# Patient Record
Sex: FEMALE | Race: WHITE | NOT HISPANIC OR LATINO | Employment: OTHER | URBAN - METROPOLITAN AREA
[De-identification: names, ages, dates, MRNs, and addresses within clinical notes are randomized per-mention and may not be internally consistent; named-entity substitution may affect disease eponyms.]

---

## 2024-06-15 ENCOUNTER — APPOINTMENT (OUTPATIENT)
Dept: CT IMAGING | Facility: HOSPITAL | Age: 84
End: 2024-06-15
Payer: MEDICARE

## 2024-06-15 ENCOUNTER — HOSPITAL ENCOUNTER (INPATIENT)
Facility: HOSPITAL | Age: 84
LOS: 2 days | Discharge: NURSING, REHAB OR LONG-TERM CARE | End: 2024-06-17
Attending: FAMILY MEDICINE | Admitting: FAMILY MEDICINE
Payer: MEDICARE

## 2024-06-15 ENCOUNTER — APPOINTMENT (OUTPATIENT)
Dept: GENERAL RADIOLOGY | Facility: HOSPITAL | Age: 84
End: 2024-06-15
Payer: MEDICARE

## 2024-06-15 DIAGNOSIS — R41.82 ALTERED MENTAL STATUS, UNSPECIFIED ALTERED MENTAL STATUS TYPE: Primary | ICD-10-CM

## 2024-06-15 DIAGNOSIS — A49.9 UTI (URINARY TRACT INFECTION), BACTERIAL: ICD-10-CM

## 2024-06-15 DIAGNOSIS — N39.0 UTI (URINARY TRACT INFECTION), BACTERIAL: ICD-10-CM

## 2024-06-15 DIAGNOSIS — R79.89 ELEVATED TROPONIN: ICD-10-CM

## 2024-06-15 LAB
ALBUMIN SERPL-MCNC: 3.9 G/DL (ref 3.5–5.2)
ALBUMIN/GLOB SERPL: 1.2 G/DL
ALP SERPL-CCNC: 84 U/L (ref 39–117)
ALT SERPL W P-5'-P-CCNC: 22 U/L (ref 1–33)
ANION GAP SERPL CALCULATED.3IONS-SCNC: 13.2 MMOL/L (ref 5–15)
AST SERPL-CCNC: 27 U/L (ref 1–32)
BACTERIA UR QL AUTO: ABNORMAL /HPF
BASOPHILS # BLD AUTO: 0.05 10*3/MM3 (ref 0–0.2)
BASOPHILS NFR BLD AUTO: 0.4 % (ref 0–1.5)
BILIRUB SERPL-MCNC: 0.3 MG/DL (ref 0–1.2)
BILIRUB UR QL STRIP: NEGATIVE
BUN SERPL-MCNC: 19 MG/DL (ref 8–23)
BUN/CREAT SERPL: 22.6 (ref 7–25)
CALCIUM SPEC-SCNC: 9 MG/DL (ref 8.6–10.5)
CHLORIDE SERPL-SCNC: 105 MMOL/L (ref 98–107)
CLARITY UR: CLEAR
CO2 SERPL-SCNC: 21.8 MMOL/L (ref 22–29)
COLOR UR: YELLOW
CREAT SERPL-MCNC: 0.84 MG/DL (ref 0.57–1)
CRP SERPL-MCNC: <0.3 MG/DL (ref 0–0.5)
D-LACTATE SERPL-SCNC: 3 MMOL/L (ref 0.5–2)
D-LACTATE SERPL-SCNC: 4.8 MMOL/L (ref 0.3–2)
DEPRECATED RDW RBC AUTO: 46.9 FL (ref 37–54)
EGFRCR SERPLBLD CKD-EPI 2021: 69 ML/MIN/1.73
EOSINOPHIL # BLD AUTO: 0.12 10*3/MM3 (ref 0–0.4)
EOSINOPHIL NFR BLD AUTO: 1 % (ref 0.3–6.2)
ERYTHROCYTE [DISTWIDTH] IN BLOOD BY AUTOMATED COUNT: 12.8 % (ref 12.3–15.4)
ERYTHROCYTE [SEDIMENTATION RATE] IN BLOOD: 15 MM/HR (ref 0–30)
GEN 5 2HR TROPONIN T REFLEX: 263 NG/L
GLOBULIN UR ELPH-MCNC: 3.3 GM/DL
GLUCOSE SERPL-MCNC: 115 MG/DL (ref 65–99)
GLUCOSE UR STRIP-MCNC: NEGATIVE MG/DL
HCT VFR BLD AUTO: 44.3 % (ref 34–46.6)
HGB BLD-MCNC: 13.8 G/DL (ref 12–15.9)
HGB UR QL STRIP.AUTO: ABNORMAL
HYALINE CASTS UR QL AUTO: ABNORMAL /LPF
IMM GRANULOCYTES # BLD AUTO: 0.28 10*3/MM3 (ref 0–0.05)
IMM GRANULOCYTES NFR BLD AUTO: 2.3 % (ref 0–0.5)
KETONES UR QL STRIP: NEGATIVE
LEUKOCYTE ESTERASE UR QL STRIP.AUTO: ABNORMAL
LYMPHOCYTES # BLD AUTO: 2.32 10*3/MM3 (ref 0.7–3.1)
LYMPHOCYTES NFR BLD AUTO: 19.3 % (ref 19.6–45.3)
MCH RBC QN AUTO: 30.7 PG (ref 26.6–33)
MCHC RBC AUTO-ENTMCNC: 31.2 G/DL (ref 31.5–35.7)
MCV RBC AUTO: 98.7 FL (ref 79–97)
MONOCYTES # BLD AUTO: 0.58 10*3/MM3 (ref 0.1–0.9)
MONOCYTES NFR BLD AUTO: 4.8 % (ref 5–12)
NEUTROPHILS NFR BLD AUTO: 72.2 % (ref 42.7–76)
NEUTROPHILS NFR BLD AUTO: 8.64 10*3/MM3 (ref 1.7–7)
NITRITE UR QL STRIP: POSITIVE
NRBC BLD AUTO-RTO: 0 /100 WBC (ref 0–0.2)
PH UR STRIP.AUTO: <=5 [PH] (ref 5–8)
PLATELET # BLD AUTO: 280 10*3/MM3 (ref 140–450)
PMV BLD AUTO: 10 FL (ref 6–12)
POTASSIUM SERPL-SCNC: 3.8 MMOL/L (ref 3.5–5.2)
PROT SERPL-MCNC: 7.2 G/DL (ref 6–8.5)
PROT UR QL STRIP: NEGATIVE
RBC # BLD AUTO: 4.49 10*6/MM3 (ref 3.77–5.28)
RBC # UR STRIP: ABNORMAL /HPF
REF LAB TEST METHOD: ABNORMAL
SODIUM SERPL-SCNC: 140 MMOL/L (ref 136–145)
SP GR UR STRIP: 1.02 (ref 1–1.03)
SQUAMOUS #/AREA URNS HPF: ABNORMAL /HPF
TROPONIN T DELTA: 64 NG/L
TROPONIN T SERPL HS-MCNC: 199 NG/L
UROBILINOGEN UR QL STRIP: ABNORMAL
WBC # UR STRIP: ABNORMAL /HPF
WBC NRBC COR # BLD AUTO: 11.99 10*3/MM3 (ref 3.4–10.8)

## 2024-06-15 PROCEDURE — 99285 EMERGENCY DEPT VISIT HI MDM: CPT

## 2024-06-15 PROCEDURE — 25010000002 CEFTRIAXONE PER 250 MG: Performed by: NURSE PRACTITIONER

## 2024-06-15 PROCEDURE — 85025 COMPLETE CBC W/AUTO DIFF WBC: CPT | Performed by: NURSE PRACTITIONER

## 2024-06-15 PROCEDURE — 25010000002 CEFTRIAXONE PER 250 MG: Performed by: STUDENT IN AN ORGANIZED HEALTH CARE EDUCATION/TRAINING PROGRAM

## 2024-06-15 PROCEDURE — 86140 C-REACTIVE PROTEIN: CPT | Performed by: NURSE PRACTITIONER

## 2024-06-15 PROCEDURE — 93005 ELECTROCARDIOGRAM TRACING: CPT | Performed by: NURSE PRACTITIONER

## 2024-06-15 PROCEDURE — 25010000002 ENOXAPARIN PER 10 MG: Performed by: NURSE PRACTITIONER

## 2024-06-15 PROCEDURE — 81001 URINALYSIS AUTO W/SCOPE: CPT | Performed by: NURSE PRACTITIONER

## 2024-06-15 PROCEDURE — 80053 COMPREHEN METABOLIC PANEL: CPT | Performed by: NURSE PRACTITIONER

## 2024-06-15 PROCEDURE — 36415 COLL VENOUS BLD VENIPUNCTURE: CPT

## 2024-06-15 PROCEDURE — 87040 BLOOD CULTURE FOR BACTERIA: CPT | Performed by: NURSE PRACTITIONER

## 2024-06-15 PROCEDURE — 71045 X-RAY EXAM CHEST 1 VIEW: CPT

## 2024-06-15 PROCEDURE — 87086 URINE CULTURE/COLONY COUNT: CPT | Performed by: NURSE PRACTITIONER

## 2024-06-15 PROCEDURE — 85652 RBC SED RATE AUTOMATED: CPT | Performed by: NURSE PRACTITIONER

## 2024-06-15 PROCEDURE — 83605 ASSAY OF LACTIC ACID: CPT | Performed by: NURSE PRACTITIONER

## 2024-06-15 PROCEDURE — 70450 CT HEAD/BRAIN W/O DYE: CPT

## 2024-06-15 PROCEDURE — 87077 CULTURE AEROBIC IDENTIFY: CPT | Performed by: NURSE PRACTITIONER

## 2024-06-15 PROCEDURE — 84484 ASSAY OF TROPONIN QUANT: CPT | Performed by: NURSE PRACTITIONER

## 2024-06-15 PROCEDURE — 25810000003 SODIUM CHLORIDE 0.9 % SOLUTION: Performed by: NURSE PRACTITIONER

## 2024-06-15 PROCEDURE — 87186 SC STD MICRODIL/AGAR DIL: CPT | Performed by: NURSE PRACTITIONER

## 2024-06-15 PROCEDURE — P9612 CATHETERIZE FOR URINE SPEC: HCPCS

## 2024-06-15 RX ORDER — BISACODYL 10 MG
10 SUPPOSITORY, RECTAL RECTAL DAILY PRN
Status: DISCONTINUED | OUTPATIENT
Start: 2024-06-15 | End: 2024-06-17 | Stop reason: HOSPADM

## 2024-06-15 RX ORDER — LANOLIN ALCOHOL/MO/W.PET/CERES
3 CREAM (GRAM) TOPICAL NIGHTLY
COMMUNITY

## 2024-06-15 RX ORDER — BISACODYL 5 MG/1
5 TABLET, DELAYED RELEASE ORAL DAILY PRN
Status: DISCONTINUED | OUTPATIENT
Start: 2024-06-15 | End: 2024-06-17 | Stop reason: HOSPADM

## 2024-06-15 RX ORDER — SODIUM CHLORIDE 9 MG/ML
40 INJECTION, SOLUTION INTRAVENOUS AS NEEDED
Status: DISCONTINUED | OUTPATIENT
Start: 2024-06-15 | End: 2024-06-17 | Stop reason: HOSPADM

## 2024-06-15 RX ORDER — ONDANSETRON 4 MG/1
4 TABLET, ORALLY DISINTEGRATING ORAL EVERY 8 HOURS PRN
COMMUNITY

## 2024-06-15 RX ORDER — ENOXAPARIN SODIUM 100 MG/ML
40 INJECTION SUBCUTANEOUS DAILY
Status: DISCONTINUED | OUTPATIENT
Start: 2024-06-15 | End: 2024-06-17 | Stop reason: HOSPADM

## 2024-06-15 RX ORDER — SODIUM CHLORIDE 0.9 % (FLUSH) 0.9 %
10 SYRINGE (ML) INJECTION EVERY 12 HOURS SCHEDULED
Status: DISCONTINUED | OUTPATIENT
Start: 2024-06-15 | End: 2024-06-17 | Stop reason: HOSPADM

## 2024-06-15 RX ORDER — ACETAMINOPHEN 325 MG/1
650 TABLET ORAL 4 TIMES DAILY
COMMUNITY

## 2024-06-15 RX ORDER — POTASSIUM CHLORIDE 20 MEQ/1
20 TABLET, EXTENDED RELEASE ORAL
COMMUNITY

## 2024-06-15 RX ORDER — SODIUM CHLORIDE 9 MG/ML
100 INJECTION, SOLUTION INTRAVENOUS CONTINUOUS
Status: DISCONTINUED | OUTPATIENT
Start: 2024-06-15 | End: 2024-06-17 | Stop reason: HOSPADM

## 2024-06-15 RX ORDER — POLYETHYLENE GLYCOL 3350 17 G/17G
17 POWDER, FOR SOLUTION ORAL DAILY PRN
COMMUNITY

## 2024-06-15 RX ORDER — NALOXONE HYDROCHLORIDE 4 MG/.1ML
1 SPRAY NASAL
COMMUNITY

## 2024-06-15 RX ORDER — DIPHENHYDRAMINE HYDROCHLORIDE 25 MG/1
5 TABLET ORAL DAILY
COMMUNITY

## 2024-06-15 RX ORDER — SODIUM CHLORIDE 0.9 % (FLUSH) 0.9 %
10 SYRINGE (ML) INJECTION AS NEEDED
Status: DISCONTINUED | OUTPATIENT
Start: 2024-06-15 | End: 2024-06-17 | Stop reason: HOSPADM

## 2024-06-15 RX ORDER — AMOXICILLIN 250 MG
2 CAPSULE ORAL 2 TIMES DAILY
Status: DISCONTINUED | OUTPATIENT
Start: 2024-06-15 | End: 2024-06-17 | Stop reason: HOSPADM

## 2024-06-15 RX ORDER — NYSTATIN 100000 U/G
1 CREAM TOPICAL 2 TIMES DAILY
COMMUNITY

## 2024-06-15 RX ORDER — LANOLIN ALCOHOL/MO/W.PET/CERES
1000 CREAM (GRAM) TOPICAL DAILY
COMMUNITY

## 2024-06-15 RX ORDER — TRAMADOL HYDROCHLORIDE 50 MG/1
50 TABLET ORAL 3 TIMES DAILY
COMMUNITY

## 2024-06-15 RX ORDER — AMLODIPINE BESYLATE 5 MG/1
5 TABLET ORAL DAILY
COMMUNITY

## 2024-06-15 RX ORDER — POLYETHYLENE GLYCOL 3350 17 G/17G
17 POWDER, FOR SOLUTION ORAL DAILY PRN
Status: DISCONTINUED | OUTPATIENT
Start: 2024-06-15 | End: 2024-06-17 | Stop reason: HOSPADM

## 2024-06-15 RX ADMIN — ENOXAPARIN SODIUM 40 MG: 100 INJECTION SUBCUTANEOUS at 21:29

## 2024-06-15 RX ADMIN — CEFTRIAXONE 1000 MG: 1 INJECTION, POWDER, FOR SOLUTION INTRAMUSCULAR; INTRAVENOUS at 21:28

## 2024-06-15 RX ADMIN — SODIUM CHLORIDE 1000 ML: 9 INJECTION, SOLUTION INTRAVENOUS at 10:14

## 2024-06-15 RX ADMIN — Medication 10 ML: at 21:32

## 2024-06-15 RX ADMIN — CEFTRIAXONE 1000 MG: 1 INJECTION, POWDER, FOR SOLUTION INTRAMUSCULAR; INTRAVENOUS at 10:17

## 2024-06-15 NOTE — H&P
[facility] Medicine Services  History & Physical    Patient Name: Amparo Cohen  : 1940  MRN: [id number]  Primary Care Physician:  Can Vieira MD  Date of admission: 6/15/2024  Date and Time of Service: 6/15/2024 at 12:43 PM EDT      Subjective      Chief Complaint: Dementia, UTI    History of Present Illness: Amparo Cohen is a 83 y.o. female with a CMH of dementia, Alzheimer's, who presented to [facility] on 6/15/2024 with altered mental status and seizure-like activity.  Patient is a resident of [facility].  EMS was called today for patient to have seizure-like activity which lasted approximately 30 minutes, patient desatted during this time and was requiring oxygen upon EMS arrival.  Patient is a very poor historian, unable to answer questions but can respond to her name being called.  No family is at bedside at this time.    In the emergency department CT head negative.  EKG NSR, chest x-ray negative.  Initial lactate 4.5, 3 on repeat.  Patient was given 1 L of IV fluids, and 1 g of Rocephin due to urine analysis which was remarkable for: Trace blood, positive nitrite, trace leukocytes, 6-10 whites, 4+ bacteria.      Review of Systems   Unable to perform ROS: Dementia       Personal History     History reviewed. No pertinent past medical history.    History reviewed. No pertinent surgical history.    Family History: family history is not on file. Otherwise pertinent FHx was reviewed and not pertinent to current issue.    Social History:      Home Medications:  Prior to Admission Medications       None              Allergies:  No Known Allergies    Objective      Vitals:   Temp:  [97.7 °F (36.5 °C)] 97.7 °F (36.5 °C)  Heart Rate:  [71-80] 71  Resp:  [16-19] 16  BP: (101-130)/(62-75) 101/71  Body mass index is 21.26 kg/m².  Physical Exam  PHYSICAL EXAM  Constitutional:  Well developed, well nourished, no acute distress, non-toxic appearance   Eyes:  PERRL, conjunctiva normal,  EOMI   HENT:  Atraumatic, external ears normal, nose normal, oropharynx moist, no pharyngeal exudates. Neck-normal range of motion, no tenderness, supple, trachea midline  Respiratory:  ctab, non-labored respirations without accessory muscle use  Cardiovascular:  Normal rate, normal rhythm, no murmurs, no gallops, no rubs   GI:  Soft, nondistended, normal bowel sounds, nontender, no organomegaly, no mass, no rebound, no guarding   :  No costovertebral angle tenderness   Musculoskeletal:  No edema, no tenderness, no deformities  Integument:  Well hydrated, no rash   Lymphatic:  No lymphadenopathy noted   Neurologic: Demented, normal motor function, normal sensory function, no focal deficits noted   Psychiatric: Babbling speech  Diagnostic Data:  Lab Results (last 24 hours)       Procedure Component Value Units Date/Time    C-reactive Protein [[id number]]  (Normal) Collected: 06/15/24 1103    Specimen: Blood from Arm, Left Updated: 06/15/24 1156     C-Reactive Protein <0.30 mg/dL     STAT Lactic Acid, Reflex [[id number]]  (Abnormal) Collected: 06/15/24 1103    Specimen: Blood Updated: 06/15/24 1137     Lactate 3.0 mmol/L     High Sensitivity Troponin T [[id number]]  (Abnormal) Collected: 06/15/24 1103    Specimen: Blood from Arm, Left Updated: 06/15/24 1136     HS Troponin T 199 ng/L     Narrative:      High Sensitive Troponin T Reference Range:  <14.0 ng/L- Negative Female for AMI  <22.0 ng/L- Negative Male for AMI  >=14 - Abnormal Female indicating possible myocardial injury.  >=22 - Abnormal Male indicating possible myocardial injury.   Clinicians would have to utilize clinical acumen, EKG, Troponin, and serial changes to determine if it is an Acute Myocardial Infarction or myocardial injury due to an underlying chronic condition.         Comprehensive Metabolic Panel [[id number]]  (Abnormal) Collected: 06/15/24 1103    Specimen: Blood from Arm, Left Updated: 06/15/24 1135     Glucose 115 mg/dL      BUN 19 mg/dL       Creatinine 0.84 mg/dL      Sodium 140 mmol/L      Potassium 3.8 mmol/L      Chloride 105 mmol/L      CO2 21.8 mmol/L      Calcium 9.0 mg/dL      Total Protein 7.2 g/dL      Albumin 3.9 g/dL      ALT (SGPT) 22 U/L      AST (SGOT) 27 U/L      Alkaline Phosphatase 84 U/L      Total Bilirubin 0.3 mg/dL      Globulin 3.3 gm/dL      A/G Ratio 1.2 g/dL      BUN/Creatinine Ratio 22.6     Anion Gap 13.2 mmol/L      eGFR 69.0 mL/min/1.73     Narrative:      GFR Normal >60  Chronic Kidney Disease <60  Kidney Failure <15    The GFR formula is only valid for adults with stable renal function between ages 18 and 70.    Blood Culture - Blood, Arm, Right [[id number]] Collected: 06/15/24 1006    Specimen: Blood from Arm, Right Updated: 06/15/24 1012    Urinalysis With Culture If Indicated - Straight Cath [[id number]]  (Abnormal) Collected: 06/15/24 0911    Specimen: Urine from Straight Cath Updated: 06/15/24 0938     Color, UA Yellow     Appearance, UA Clear     pH, UA <=5.0     Specific Gravity, UA 1.019     Glucose, UA Negative     Ketones, UA Negative     Bilirubin, UA Negative     Blood, UA Trace     Protein, UA Negative     Leuk Esterase, UA Trace     Nitrite, UA Positive     Urobilinogen, UA 0.2 E.U./dL    Narrative:      In absence of clinical symptoms, the presence of pyuria, bacteria, and/or nitrites on the urinalysis result does not correlate with infection.    Urinalysis, Microscopic Only - Straight Cath [[id number]]  (Abnormal) Collected: 06/15/24 0911    Specimen: Urine from Straight Cath Updated: 06/15/24 0938     RBC, UA 0-2 /HPF      WBC, UA 6-10 /HPF      Bacteria, UA 4+ /HPF      Squamous Epithelial Cells, UA 3-6 /HPF      Hyaline Casts, UA 3-6 /LPF      Methodology Automated Microscopy    Urine Culture - Urine, Straight Cath [[id number]] Collected: 06/15/24 0911    Specimen: Urine from Straight Cath Updated: 06/15/24 0938    Sedimentation Rate [[id number]]  (Normal) Collected: 06/15/24 0830    Specimen: Blood from Arm,  Left Updated: 06/15/24 0841     Sed Rate 15 mm/hr     CBC & Differential [[id number]]  (Abnormal) Collected: 06/15/24 0830    Specimen: Blood from Arm, Left Updated: 06/15/24 0837    Narrative:      The following orders were created for panel order CBC & Differential.  Procedure                               Abnormality         Status                     ---------                               -----------         ------                     CBC Auto Differential[[id number]]        Abnormal            Final result                 Please view results for these tests on the individual orders.    CBC Auto Differential [[id number]]  (Abnormal) Collected: 06/15/24 0830    Specimen: Blood from Arm, Left Updated: 06/15/24 0837     WBC 11.99 10*3/mm3      RBC 4.49 10*6/mm3      Hemoglobin 13.8 g/dL      Hematocrit 44.3 %      MCV 98.7 fL      MCH 30.7 pg      MCHC 31.2 g/dL      RDW 12.8 %      RDW-SD 46.9 fl      MPV 10.0 fL      Platelets 280 10*3/mm3      Neutrophil % 72.2 %      Lymphocyte % 19.3 %      Monocyte % 4.8 %      Eosinophil % 1.0 %      Basophil % 0.4 %      Immature Grans % 2.3 %      Neutrophils, Absolute 8.64 10*3/mm3      Lymphocytes, Absolute 2.32 10*3/mm3      Monocytes, Absolute 0.58 10*3/mm3      Eosinophils, Absolute 0.12 10*3/mm3      Basophils, Absolute 0.05 10*3/mm3      Immature Grans, Absolute 0.28 10*3/mm3      nRBC 0.0 /100 WBC     POC Lactate [[id number]]  (Abnormal) Collected: 06/15/24 0834    Specimen: Blood Updated: 06/15/24 0836     Lactate 4.8 mmol/L      Comment: Serial Number: [id number]:  [id number]       Blood Culture - Blood, Arm, Left [[id number]] Collected: 06/15/24 0830    Specimen: Blood from Arm, Left Updated: 06/15/24 0833             Imaging Results (Last 24 Hours)       Procedure Component Value Units Date/Time    CT Head Without Contrast [[id number]] Collected: 06/15/24 0854     Updated: 06/15/24 0857    Narrative:      CT HEAD WO CONTRAST    Date of Exam: 6/15/2024 8:43 AM  EDT    Indication: new onset of seizures.    Comparison: None available.    Technique: Axial CT images were obtained of the head without contrast administration.  Coronal reconstructions were performed.  Automated exposure control and iterative reconstruction methods were used.      Findings:  There is motion artifact which limits image quality.    There is no evidence of acute territorial infarction.    There is no acute intracranial hemorrhage. There are no extra-axial collections.    Ventricles and CSF spaces are symmetrically prominent. No mass effect nor hydrocephalus.    There is moderate to advanced white matter hypoattenuation most frequently seen in the setting of microvascular ischemic disease. No underlying mass is identified on unenhanced CT imaging.     Paranasal sinuses and mastoid air cells are adequately aerated.     Osseous structures and orbits appear intact.      Impression:      Impression:  1.Advanced senescent changes of the brain without mass lesion or acute process identified on CT imaging.      Electronically Signed: Mike Leon MD    6/15/2024 8:55 AM EDT    Workstation ID: [id number]    XR Chest 1 View [[id number]] Collected: 06/15/24 0826     Updated: 06/15/24 0830    Narrative:      XR CHEST 1 VW    Date of Exam: 6/15/2024 8:25 AM EDT    Indication: altered mental    Comparison: None available.    Findings:  Heart size borderline. No gross infiltrate or edema. Atelectasis in the right lung base. Moderate hiatal hernia apparently containing a loop of intestine      Impression:      Impression:    1. No gross infiltrate or edema  2. Hiatal hernia      Electronically Signed: Timoteo Han    6/15/2024 8:28 AM EDT    Workstation ID: [id number]              Assessment & Plan        This is a 83 y.o. female with:    Active and Resolved Problems  Active Hospital Problems    Diagnosis  POA    **UTI (urinary tract infection) [N39.0]  Yes      Resolved Hospital Problems   No resolved problems to  display.       UTI  Dehydration  - MIVF  - Continue Rocephin every 24 hours  - Urine cultures pending    Dementia  - Continue Zoloft, tramadol, melatonin    HTN  -Continue amlodipine    VTE Prophylaxis:  Pharmacologic VTE prophylaxis orders are present.        The patient desires to be as follows:    CODE STATUS:           Yasmine Blum, who can be contacted at [phone], is the designated person to make medical decisions on the patient's behalf if She is incapable of doing so. This was clarified with patient and/or next of kin on 6/15/2024 during the course of this H&P.    Admission Status:  I believe this patient meets inpatient status.    Expected Length of Stay: 2 nights    PDMP and Medication Dispenses via Sidebar reviewed and consistent with patient reported medications.    I discussed the patient's findings and my recommendations with patient.      Signature:     This document has been electronically signed by PHIL Daniel on Violeta 15, 2024 12:40 EDT   [facility]

## 2024-06-15 NOTE — Clinical Note
Level of Care: Telemetry [5]   Admitting Physician: KAREN NIX [[id number]]   Attending Physician: KAREN NIX [[id number]]

## 2024-06-15 NOTE — LETTER
EMS Transport Request  For use at [facility], [place], [place], [place], and [place] only   Patient Name: Amparo Cohen : 1940   Weight:55.1 kg (121 lb 7.6 oz) Pick-up Location: 2108 ([facility]) BLS/ALS: BLS/ALS: BLS   Insurance: [employer] MEDICARE REPLACEMENT Auth End Date: N/A   Pre-Cert #: N/A D/C Summary complete: Yes   Destination: Other [facility]   Contact Precautions: None   Equipment (O2, Fluids, etc.): None   Arrive By Date/Time: 24 1500 Stretcher/WC: Stretcher   CM Requesting: Dania Waldron RN     Office Phone: [phone]  Office Cell: [phone]   Ext: [phone]   Notes/Medical Necessity: Dementia, Alzheimer's, confused     ______________________________________________________________________    *Only 2 patient bags OR 1 carry-on size bag are permitted.  Wheelchairs and walkers CANNOT transported with the patient. Acknowledge: Yes

## 2024-06-16 LAB
ALBUMIN SERPL-MCNC: 3.7 G/DL (ref 3.5–5.2)
ALBUMIN SERPL-MCNC: 3.9 G/DL (ref 3.5–5.2)
ALBUMIN/GLOB SERPL: 1.2 G/DL
ALBUMIN/GLOB SERPL: 1.3 G/DL
ALP SERPL-CCNC: 89 U/L (ref 39–117)
ALP SERPL-CCNC: 93 U/L (ref 39–117)
ALT SERPL W P-5'-P-CCNC: 18 U/L (ref 1–33)
ALT SERPL W P-5'-P-CCNC: 20 U/L (ref 1–33)
ANION GAP SERPL CALCULATED.3IONS-SCNC: 11.3 MMOL/L (ref 5–15)
ANION GAP SERPL CALCULATED.3IONS-SCNC: 13.1 MMOL/L (ref 5–15)
AST SERPL-CCNC: 35 U/L (ref 1–32)
AST SERPL-CCNC: 38 U/L (ref 1–32)
BASOPHILS # BLD AUTO: 0.04 10*3/MM3 (ref 0–0.2)
BASOPHILS # BLD AUTO: 0.04 10*3/MM3 (ref 0–0.2)
BASOPHILS NFR BLD AUTO: 0.3 % (ref 0–1.5)
BASOPHILS NFR BLD AUTO: 0.3 % (ref 0–1.5)
BILIRUB SERPL-MCNC: 0.6 MG/DL (ref 0–1.2)
BILIRUB SERPL-MCNC: 0.7 MG/DL (ref 0–1.2)
BUN SERPL-MCNC: 14 MG/DL (ref 8–23)
BUN SERPL-MCNC: 16 MG/DL (ref 8–23)
BUN/CREAT SERPL: 17.6 (ref 7–25)
BUN/CREAT SERPL: 19.7 (ref 7–25)
CALCIUM SPEC-SCNC: 8.6 MG/DL (ref 8.6–10.5)
CALCIUM SPEC-SCNC: 8.7 MG/DL (ref 8.6–10.5)
CHLORIDE SERPL-SCNC: 103 MMOL/L (ref 98–107)
CHLORIDE SERPL-SCNC: 104 MMOL/L (ref 98–107)
CO2 SERPL-SCNC: 20.9 MMOL/L (ref 22–29)
CO2 SERPL-SCNC: 21.7 MMOL/L (ref 22–29)
CREAT SERPL-MCNC: 0.71 MG/DL (ref 0.57–1)
CREAT SERPL-MCNC: 0.91 MG/DL (ref 0.57–1)
D-LACTATE SERPL-SCNC: 1.9 MMOL/L (ref 0.5–2)
DEPRECATED RDW RBC AUTO: 43.1 FL (ref 37–54)
DEPRECATED RDW RBC AUTO: 45 FL (ref 37–54)
EGFRCR SERPLBLD CKD-EPI 2021: 62.7 ML/MIN/1.73
EGFRCR SERPLBLD CKD-EPI 2021: 84.5 ML/MIN/1.73
EOSINOPHIL # BLD AUTO: 0 10*3/MM3 (ref 0–0.4)
EOSINOPHIL # BLD AUTO: 0.02 10*3/MM3 (ref 0–0.4)
EOSINOPHIL NFR BLD AUTO: 0 % (ref 0.3–6.2)
EOSINOPHIL NFR BLD AUTO: 0.2 % (ref 0.3–6.2)
ERYTHROCYTE [DISTWIDTH] IN BLOOD BY AUTOMATED COUNT: 12.4 % (ref 12.3–15.4)
ERYTHROCYTE [DISTWIDTH] IN BLOOD BY AUTOMATED COUNT: 12.7 % (ref 12.3–15.4)
GLOBULIN UR ELPH-MCNC: 2.9 GM/DL
GLOBULIN UR ELPH-MCNC: 3.2 GM/DL
GLUCOSE SERPL-MCNC: 119 MG/DL (ref 65–99)
GLUCOSE SERPL-MCNC: 121 MG/DL (ref 65–99)
HCT VFR BLD AUTO: 40.5 % (ref 34–46.6)
HCT VFR BLD AUTO: 42 % (ref 34–46.6)
HGB BLD-MCNC: 13.1 G/DL (ref 12–15.9)
HGB BLD-MCNC: 13.8 G/DL (ref 12–15.9)
IMM GRANULOCYTES # BLD AUTO: 0.05 10*3/MM3 (ref 0–0.05)
IMM GRANULOCYTES # BLD AUTO: 0.06 10*3/MM3 (ref 0–0.05)
IMM GRANULOCYTES NFR BLD AUTO: 0.4 % (ref 0–0.5)
IMM GRANULOCYTES NFR BLD AUTO: 0.4 % (ref 0–0.5)
LARGE PLATELETS: NORMAL
LYMPHOCYTES # BLD AUTO: 1.43 10*3/MM3 (ref 0.7–3.1)
LYMPHOCYTES # BLD AUTO: 1.64 10*3/MM3 (ref 0.7–3.1)
LYMPHOCYTES NFR BLD AUTO: 10.4 % (ref 19.6–45.3)
LYMPHOCYTES NFR BLD AUTO: 14.1 % (ref 19.6–45.3)
MCH RBC QN AUTO: 31 PG (ref 26.6–33)
MCH RBC QN AUTO: 31.1 PG (ref 26.6–33)
MCHC RBC AUTO-ENTMCNC: 32.3 G/DL (ref 31.5–35.7)
MCHC RBC AUTO-ENTMCNC: 32.9 G/DL (ref 31.5–35.7)
MCV RBC AUTO: 94.4 FL (ref 79–97)
MCV RBC AUTO: 96.2 FL (ref 79–97)
MONOCYTES # BLD AUTO: 1.02 10*3/MM3 (ref 0.1–0.9)
MONOCYTES # BLD AUTO: 1.16 10*3/MM3 (ref 0.1–0.9)
MONOCYTES NFR BLD AUTO: 10 % (ref 5–12)
MONOCYTES NFR BLD AUTO: 7.4 % (ref 5–12)
NEUTROPHILS NFR BLD AUTO: 11.15 10*3/MM3 (ref 1.7–7)
NEUTROPHILS NFR BLD AUTO: 75 % (ref 42.7–76)
NEUTROPHILS NFR BLD AUTO: 8.73 10*3/MM3 (ref 1.7–7)
NEUTROPHILS NFR BLD AUTO: 81.5 % (ref 42.7–76)
NRBC BLD AUTO-RTO: 0 /100 WBC (ref 0–0.2)
NRBC BLD AUTO-RTO: 0 /100 WBC (ref 0–0.2)
PLATELET # BLD AUTO: 223 10*3/MM3 (ref 140–450)
PLATELET # BLD AUTO: 256 10*3/MM3 (ref 140–450)
PMV BLD AUTO: 10.1 FL (ref 6–12)
PMV BLD AUTO: 9.9 FL (ref 6–12)
POTASSIUM SERPL-SCNC: 3.3 MMOL/L (ref 3.5–5.2)
POTASSIUM SERPL-SCNC: 3.6 MMOL/L (ref 3.5–5.2)
PROT SERPL-MCNC: 6.6 G/DL (ref 6–8.5)
PROT SERPL-MCNC: 7.1 G/DL (ref 6–8.5)
RBC # BLD AUTO: 4.21 10*6/MM3 (ref 3.77–5.28)
RBC # BLD AUTO: 4.45 10*6/MM3 (ref 3.77–5.28)
RBC MORPH BLD: NORMAL
SODIUM SERPL-SCNC: 137 MMOL/L (ref 136–145)
SODIUM SERPL-SCNC: 137 MMOL/L (ref 136–145)
WBC MORPH BLD: NORMAL
WBC NRBC COR # BLD AUTO: 11.64 10*3/MM3 (ref 3.4–10.8)
WBC NRBC COR # BLD AUTO: 13.7 10*3/MM3 (ref 3.4–10.8)

## 2024-06-16 PROCEDURE — 85007 BL SMEAR W/DIFF WBC COUNT: CPT | Performed by: NURSE PRACTITIONER

## 2024-06-16 PROCEDURE — 25010000002 ENOXAPARIN PER 10 MG: Performed by: NURSE PRACTITIONER

## 2024-06-16 PROCEDURE — 83605 ASSAY OF LACTIC ACID: CPT | Performed by: INTERNAL MEDICINE

## 2024-06-16 PROCEDURE — 80053 COMPREHEN METABOLIC PANEL: CPT | Performed by: NURSE PRACTITIONER

## 2024-06-16 PROCEDURE — 85025 COMPLETE CBC W/AUTO DIFF WBC: CPT | Performed by: NURSE PRACTITIONER

## 2024-06-16 PROCEDURE — 25010000002 CEFTRIAXONE PER 250 MG: Performed by: STUDENT IN AN ORGANIZED HEALTH CARE EDUCATION/TRAINING PROGRAM

## 2024-06-16 PROCEDURE — 25810000003 SODIUM CHLORIDE 0.9 % SOLUTION: Performed by: NURSE PRACTITIONER

## 2024-06-16 PROCEDURE — 25810000003 SODIUM CHLORIDE 0.9 % SOLUTION: Performed by: INTERNAL MEDICINE

## 2024-06-16 RX ORDER — POTASSIUM CHLORIDE 7.45 MG/ML
10 INJECTION INTRAVENOUS
Status: COMPLETED | OUTPATIENT
Start: 2024-06-17 | End: 2024-06-17

## 2024-06-16 RX ADMIN — SENNOSIDES AND DOCUSATE SODIUM 2 TABLET: 50; 8.6 TABLET ORAL at 20:56

## 2024-06-16 RX ADMIN — CEFTRIAXONE 1000 MG: 1 INJECTION, POWDER, FOR SOLUTION INTRAMUSCULAR; INTRAVENOUS at 20:56

## 2024-06-16 RX ADMIN — Medication 10 ML: at 20:57

## 2024-06-16 RX ADMIN — ENOXAPARIN SODIUM 40 MG: 100 INJECTION SUBCUTANEOUS at 16:46

## 2024-06-16 RX ADMIN — SODIUM CHLORIDE 100 ML/HR: 9 INJECTION, SOLUTION INTRAVENOUS at 02:14

## 2024-06-16 RX ADMIN — SODIUM CHLORIDE 1000 ML: 9 INJECTION, SOLUTION INTRAVENOUS at 07:35

## 2024-06-16 RX ADMIN — SODIUM CHLORIDE 100 ML/HR: 9 INJECTION, SOLUTION INTRAVENOUS at 19:31

## 2024-06-16 NOTE — PLAN OF CARE
Goal Outcome Evaluation:  Plan of Care Reviewed With: patient        Progress: no change       Patient remains confused. Patient is not able to follow directions. No new orders at this time

## 2024-06-16 NOTE — PLAN OF CARE
Goal Outcome Evaluation:      This RN assumed care of this pt at 1300. Pt resting comfortably this shift. Pt with some oral intake this shift. Unable to verbalize wants or needs. Pt turned Q2 to prevent pressure injuries. VSS.       Problem: Adult Inpatient Plan of Care  Goal: Plan of Care Review  Outcome: Ongoing, Progressing  Goal: Patient-Specific Goal (Individualized)  Outcome: Ongoing, Progressing  Goal: Absence of Hospital-Acquired Illness or Injury  Outcome: Ongoing, Progressing  Intervention: Identify and Manage Fall Risk  Recent Flowsheet Documentation  Taken 6/16/2024 1830 by Ana Lilia Serna RN  Safety Promotion/Fall Prevention:   safety round/check completed   room organization consistent   nonskid shoes/slippers when out of bed   fall prevention program maintained   clutter free environment maintained   assistive device/personal items within reach  Taken 6/16/2024 1645 by Ana Lilia Serna RN  Safety Promotion/Fall Prevention:   safety round/check completed   room organization consistent   nonskid shoes/slippers when out of bed   fall prevention program maintained   clutter free environment maintained   assistive device/personal items within reach  Taken 6/16/2024 1445 by Ana Lilia Serna RN  Safety Promotion/Fall Prevention:   safety round/check completed   room organization consistent   nonskid shoes/slippers when out of bed   fall prevention program maintained   clutter free environment maintained   assistive device/personal items within reach  Intervention: Prevent Skin Injury  Recent Flowsheet Documentation  Taken 6/16/2024 1830 by Ana Lilia Serna RN  Body Position:   supine   weight shifting  Taken 6/16/2024 1645 by Ana Lilia Serna RN  Body Position:   tilted   right  Taken 6/16/2024 1445 by Ana Lilia Serna RN  Body Position:   tilted   left   position changed independently   weight shifting  Intervention: Prevent and Manage VTE (Venous Thromboembolism) Risk  Recent  Flowsheet Documentation  Taken 6/16/2024 1830 by Ana Lilia Serna RN  Activity Management: bedrest  Taken 6/16/2024 1645 by Ana Lilia Serna RN  Activity Management: bedrest  Taken 6/16/2024 1445 by Ana Lilia Serna RN  Activity Management: activity minimized  Intervention: Prevent Infection  Recent Flowsheet Documentation  Taken 6/16/2024 1830 by Ana Lilia Serna RN  Infection Prevention:   single patient room provided   personal protective equipment utilized   equipment surfaces disinfected   hand hygiene promoted  Taken 6/16/2024 1645 by Ana Lilia Serna RN  Infection Prevention:   single patient room provided   hand hygiene promoted   personal protective equipment utilized   equipment surfaces disinfected  Taken 6/16/2024 1445 by Ana Lilia Serna RN  Infection Prevention:   single patient room provided   rest/sleep promoted   personal protective equipment utilized   hand hygiene promoted   equipment surfaces disinfected  Goal: Optimal Comfort and Wellbeing  Outcome: Ongoing, Progressing  Intervention: Monitor Pain and Promote Comfort  Recent Flowsheet Documentation  Taken 6/16/2024 1645 by Ana Lilia Serna RN  Pain Management Interventions:   care clustered   pain management plan reviewed with patient/caregiver   pillow support provided   position adjusted   quiet environment facilitated   relaxation techniques promoted  Goal: Readiness for Transition of Care  Outcome: Ongoing, Progressing     Problem: Skin Injury Risk Increased  Goal: Skin Health and Integrity  Outcome: Ongoing, Progressing  Intervention: Optimize Skin Protection  Recent Flowsheet Documentation  Taken 6/16/2024 1830 by Ana Lilia Serna RN  Head of Bed (HOB) Positioning: HOB elevated  Taken 6/16/2024 1645 by Ana Lilia Serna RN  Head of Bed (HOB) Positioning: HOB elevated  Taken 6/16/2024 1445 by Ana Lilia Serna RN  Head of Bed (HOB) Positioning: HOB elevated

## 2024-06-16 NOTE — PROGRESS NOTES
[facility] MEDICINE SERVICE  DAILY PROGRESS NOTE    NAME: Amparo Cohen  : 1940  MRN: [id number]      LOS: 1 day     PROVIDER OF SERVICE: Jay Johnson MD    Chief Complaint: UTI (urinary tract infection)    Subjective:     Interval History: Patient more communicative today but still very confused and demented at baseline.    Review of Systems:   Review of Systems    Objective:     Vital Signs  Temp:  [97.5 °F (36.4 °C)-98 °F (36.7 °C)] 97.5 °F (36.4 °C)  Heart Rate:  [] 75  Resp:  [15-24] 16  BP: ()/() 95/77   Body mass index is 20.93 kg/m².    Physical Exam  Physical Exam  General Appearance: Alert, does not answer questions appropriately due to baseline dementia  Head:  Normocephalic, without obvious abnormality, atraumatic  Eyes:  PERRL, conjunctiva/corneas clear, EOM's intact, fundi benign, both eyes  Ears:  Normal TM's and external ear canals, both ears  Nose: Nares normal, septum midline, mucosa normal, no drainage or sinus tenderness  Throat: Lips, mucosa, and tongue normal; teeth and gums normal  Neck: Supple, symmetrical, trachea midline, no adenopathy, thyroid: not enlarged, symmetric, no tenderness/mass/nodules, no carotid bruit or JVD  Lungs:   Clear to auscultation bilaterally, respirations unlabored  Heart:  Regular rate and rhythm, S1, S2 normal, no murmur, rub or gallop  Abdomen:  Soft, non-tender, bowel sounds active all four quadrants,  no masses, no organomegaly  Extremities: Extremities normal, atraumatic, no cyanosis or edema  Pulses: 2+ and symmetric  Skin: Skin color, texture, turgor normal, no rashes or lesions  Neurologic: Severe dementia at baseline      Scheduled Meds   cefTRIAXone, 1,000 mg, Intravenous, Q24H  enoxaparin, 40 mg, Subcutaneous, Daily  senna-docusate sodium, 2 tablet, Oral, BID  sodium chloride, 10 mL, Intravenous, Q12H       PRN Meds     senna-docusate sodium **AND** polyethylene glycol **AND** bisacodyl **AND** bisacodyl    Calcium  Replacement - Follow Nurse / BPA Driven Protocol    Magnesium Standard Dose Replacement - Follow Nurse / BPA Driven Protocol    Phosphorus Replacement - Follow Nurse / BPA Driven Protocol    Potassium Replacement - Follow Nurse / BPA Driven Protocol    [COMPLETED] Insert Peripheral IV **AND** sodium chloride    sodium chloride    sodium chloride   Infusions  sodium chloride, 100 mL/hr, Last Rate: 100 mL/hr (06/16/24 0214)          Diagnostic Data    Results from last 7 days   Lab Units 06/16/24  0744   WBC 10*3/mm3 13.70*   HEMOGLOBIN g/dL 13.8   HEMATOCRIT % 42.0   PLATELETS 10*3/mm3 256   GLUCOSE mg/dL 119*   CREATININE mg/dL 0.71   BUN mg/dL 14   SODIUM mmol/L 137   POTASSIUM mmol/L 3.6   AST (SGOT) U/L 38*   ALT (SGPT) U/L 20   ALK PHOS U/L 89   BILIRUBIN mg/dL 0.7   ANION GAP mmol/L 13.1       CT Head Without Contrast    Result Date: 6/15/2024  Impression: 1.Advanced senescent changes of the brain without mass lesion or acute process identified on CT imaging. Electronically Signed: Miek Leon MD  6/15/2024 8:55 AM EDT  Workstation ID: [id number]    XR Chest 1 View    Result Date: 6/15/2024  Impression: 1. No gross infiltrate or edema 2. Hiatal hernia Electronically Signed: Timoteo Han  6/15/2024 8:28 AM EDT  Workstation ID: [id number]       I reviewed the patient's new clinical results.    Assessment/Plan:     Active and Resolved Problems  Active Hospital Problems    Diagnosis  POA    **UTI (urinary tract infection) [N39.0]  Yes      Resolved Hospital Problems   No resolved problems to display.       Acute UTI with volume depletion  -Per urinalysis  -Continue IV Rocephin  -Follow-up urine culture  -Continue IV fluids    Possible seizure episode  -Unclear if this was a true seizure, however patient did have some confusion with some shaking spells  -Likely related to underlying UTI  -Continue UTI treatment as above  -Hold off on AED medication given that if the patient did have seizure, it was likely triggered  by underlying UTI    Advanced dementia  -Continue home medications    Hypertension  -Continue amlodipine    VTE Prophylaxis:  Pharmacologic VTE prophylaxis orders are present.         Code status is   Code Status and Medical Interventions:   Ordered at: 06/15/24 1300     Level Of Support Discussed With:    Next of Kin (If No Surrogate)     Code Status (Patient has no pulse and is not breathing):    No CPR (Do Not Attempt to Resuscitate)     Medical Interventions (Patient has pulse or is breathing):    Comfort Measures     Comments:    Per post form- with patient       Plan for disposition: Hopefully DC back to her LTC on 6/18    Time: 30 minutes    Signature: Electronically signed by Jay Johnson MD, 06/16/24, 09:14 EDT.  [facility] Hospitalist Team

## 2024-06-16 NOTE — NURSING NOTE
Patient is confused and will not stay still in order to get labs. This nurse had 2 techs help and only the green top tube was obtained. This nurse tried a second attempt to get labs and the patient became a little combative. Day charge will be notified.

## 2024-06-17 ENCOUNTER — APPOINTMENT (OUTPATIENT)
Dept: NEUROLOGY | Facility: HOSPITAL | Age: 84
End: 2024-06-17
Payer: MEDICARE

## 2024-06-17 VITALS
RESPIRATION RATE: 19 BRPM | WEIGHT: 121.47 LBS | BODY MASS INDEX: 21.52 KG/M2 | DIASTOLIC BLOOD PRESSURE: 48 MMHG | SYSTOLIC BLOOD PRESSURE: 131 MMHG | HEART RATE: 59 BPM | TEMPERATURE: 98 F | HEIGHT: 63 IN | OXYGEN SATURATION: 92 %

## 2024-06-17 LAB
BACTERIA SPEC AEROBE CULT: ABNORMAL
POTASSIUM SERPL-SCNC: 4.3 MMOL/L (ref 3.5–5.2)
QT INTERVAL: 408 MS
QTC INTERVAL: 445 MS

## 2024-06-17 PROCEDURE — 84132 ASSAY OF SERUM POTASSIUM: CPT | Performed by: INTERNAL MEDICINE

## 2024-06-17 PROCEDURE — 95812 EEG 41-60 MINUTES: CPT

## 2024-06-17 PROCEDURE — 25010000002 POTASSIUM CHLORIDE 10 MEQ/100ML SOLUTION: Performed by: INTERNAL MEDICINE

## 2024-06-17 RX ORDER — CEFDINIR 300 MG/1
300 CAPSULE ORAL 2 TIMES DAILY
Qty: 10 CAPSULE | Refills: 0 | Status: SHIPPED | OUTPATIENT
Start: 2024-06-17 | End: 2024-06-22

## 2024-06-17 RX ADMIN — POTASSIUM CHLORIDE 10 MEQ: 7.46 INJECTION, SOLUTION INTRAVENOUS at 01:44

## 2024-06-17 RX ADMIN — Medication 10 ML: at 08:01

## 2024-06-17 RX ADMIN — POTASSIUM CHLORIDE 10 MEQ: 7.46 INJECTION, SOLUTION INTRAVENOUS at 00:05

## 2024-06-17 RX ADMIN — POTASSIUM CHLORIDE 10 MEQ: 7.46 INJECTION, SOLUTION INTRAVENOUS at 02:45

## 2024-06-17 RX ADMIN — POTASSIUM CHLORIDE 10 MEQ: 7.46 INJECTION, SOLUTION INTRAVENOUS at 03:45

## 2024-06-17 NOTE — NURSING NOTE
This RN made an attempt to call report to hal arreguin but was not able to reach of that nurse, will try again shortly.

## 2024-06-17 NOTE — PLAN OF CARE
Goal Outcome Evaluation:           Progress: no change  Outcome Evaluation: Patient on potassium replacement with morning lab showing 3.3. Resting comfortably. VSS.

## 2024-06-17 NOTE — CASE MANAGEMENT/SOCIAL WORK
Discharge Planning Assessment   Emir     Patient Name: Amparo Cohen  MRN: [id number]  Today's Date: 6/17/2024    Admit Date: 6/15/2024    Plan: Anticipate return to [facility]. No precert or PASRR needed for return.   Discharge Needs Assessment       Row Name 06/17/24 1355       Living Environment    People in Home facility resident  [facility]    Current Living Arrangements extended care facility    Potentially Unsafe Housing Conditions none    In the past 12 months has the electric, gas, oil, or water company threatened to shut off services in your home? No    Primary Care Provided by other (see comments)    Provides Primary Care For no one    Family Caregiver if Needed sibling(s);other relative(s)    Family Caregiver Names Sister- Sabinaty, niece- Aurea    Quality of Family Relationships helpful;involved;supportive    Able to Return to Prior Arrangements yes       Resource/Environmental Concerns    Resource/Environmental Concerns none    Transportation Concerns none       Transportation Needs    In the past 12 months, has lack of transportation kept you from medical appointments or from getting medications? no    In the past 12 months, has lack of transportation kept you from meetings, work, or from getting things needed for daily living? No       Food Insecurity    Within the past 12 months, you worried that your food would run out before you got the money to buy more. Never true    Within the past 12 months, the food you bought just didn't last and you didn't have money to get more. Never true       Transition Planning    Patient/Family Anticipates Transition to long-term care facility    Patient/Family Anticipated Services at Transition none    Transportation Anticipated health plan transportation       Discharge Needs Assessment    Readmission Within the Last 30 Days no previous admission in last 30 days    Equipment Currently Used at Home none    Anticipated Changes Related to Illness none     Equipment Needed After Discharge none    Discharge Facility/Level of Care Needs nursing facility, intermediate    Provided Post Acute Provider List? N/A                   Discharge Plan       Row Name 06/17/24 6083       Plan    Plan Anticipate return to [facility]. No precert or PASRR needed for return.    Patient/Family in Agreement with Plan yes    Plan Comments Due to noted confused, CM contacted patient’s sister, Farzaneh ([phone]) to discuss dc planning and IMM letter. No answer so VM was left with CM name and callback number. CM contacted relative listed in emergency contacts, Aurea ([phone]). She confirmed she was patient’s niece. Reported that she was currently at [facility] with her daughter who was getting a heart monitor. Reported that she visited patient last night and was planning to visit her again today to bring her clothes. DC orders noted. Discussed with Aurea. Confirmed patient from [facility] and travels via EMS. CM added in [facility] basket and sent to liaison Joslyn. Liaison confirmed that patient is from [facility] and can return once ready. EMS medical necessity form completed and information packet provided to RN for EMS. [facility] transportation scheduled via ad hoc.    Final Discharge Disposition Code 04 - intermediate care facility    Final Note [facility].                  Continued Care and Services - Admitted Since 6/15/2024       Destination Coordination complete.      Service Provider Request Status Selected Services Address Phone Fax Patient Preferred    [facility] [place]  Selected Nursing Home [address] [place] [address] [place] [id number] [phone]                   Expected Discharge Date and Time       Expected Discharge Date Expected Discharge Time    Jun 17, 2024            Demographic Summary       Row Name 06/17/24 1354       General Information    Admission Type inpatient    Arrived From emergency  department    Required Notices Provided Important Message from Medicare    Referral Source admission list    Reason for Consult discharge planning    Preferred Language English       Contact Information    Permission Granted to Share Info With                    Functional Status       Row Name 06/17/24 1354       Functional Status    Usual Activity Tolerance poor    Current Activity Tolerance poor       Functional Status, IADL    Medications completely dependent    Meal Preparation completely dependent    Housekeeping completely dependent    Laundry completely dependent    Shopping completely dependent           Case Management Discharge Note      Final Note: [facility].      Selected Continued Care - Admitted Since 6/15/2024       Destination Coordination complete.      Service Provider Selected Services Address Phone Fax Patient Preferred    [facility] [place] [address] [place] [address] [place] [facility] [phone]                     Transportation Services  Ambulance: [facility]    Final Discharge Disposition Code: 04 - intermediate care facility    Dania Waldorn RN     Office Phone: [phone]  Office Cell: [phone]

## 2024-06-17 NOTE — DISCHARGE SUMMARY
[facility] Medicine Services  Discharge Summary    Date of Service: 2024  Patient Name: Amparo Cohen  : 1940  MRN: [id number]    Date of Admission: 6/15/2024  Discharge Diagnosis:   Acute UTI  Dehydration secondary to UTI  Possible seizure related to UTI  Advanced dementia  Hypertension    Date of Discharge: 2024  Primary Care Physician: Can Vieira MD      Presenting Problem:   UTI (urinary tract infection) [N39.0]  Elevated troponin [R79.89]  UTI (urinary tract infection), bacterial [N39.0, A49.9]  Altered mental status, unspecified altered mental status type [R41.82]    Active and Resolved Hospital Problems:  Active Hospital Problems    Diagnosis POA    **UTI (urinary tract infection) [N39.0] Yes      Resolved Hospital Problems   No resolved problems to display.         Hospital Course     HPI:  Patient is a 83-year-old female who presented to the hospital after a possible witnessed seizure with confusion.  Please see H&P for details.    Hospital Course:  The patient has advanced dementia and is a long-term care resident of [facility].  She had some mental status changes from her baseline and had a questionable witnessed seizure episode.  She was brought to the hospital where she was found to have acute UTI.  Urine cultures grew E. coli with multidrug resistance.  She was started on IV Rocephin and will be transitioned to oral cefdinir to complete a course of treatment.  It is unclear if the patient did have a true seizure, however if she did, this was likely related to her underlying UTI.  Therefore AEDs are not indicated at this time unless she has recurrent seizures.  EEG was performed prior to discharge.  She is stable for discharge back to her LTC facility.        DISCHARGE Follow Up Recommendations for labs and diagnostics: Follow-up with your PCP in 1 week at the LTC facility.      Reasons For Change In Medications and Indications for New Medications:      Day  of Discharge     Vital Signs:  Temp:  [97.6 °F (36.4 °C)-99.6 °F (37.6 °C)] 99.6 °F (37.6 °C)  Heart Rate:  [68-71] 71  Resp:  [14-24] 14  BP: (102-130)/(57-77) 130/58    Physical Exam:  Physical Exam   General Appearance:  Alert, cooperative, no distress, appears stated age, demented at baseline  Head:  Normocephalic, without obvious abnormality, atraumatic  Eyes:  PERRL, conjunctiva/corneas clear, EOM's intact, fundi benign, both eyes  Ears:  Normal TM's and external ear canals, both ears  Nose: Nares normal, septum midline, mucosa normal, no drainage or sinus tenderness  Throat: Lips, mucosa, and tongue normal; teeth and gums normal  Neck: Supple, symmetrical, trachea midline, no adenopathy, thyroid: not enlarged, symmetric, no tenderness/mass/nodules, no carotid bruit or JVD  Lungs:   Clear to auscultation bilaterally, respirations unlabored  Heart:  Regular rate and rhythm, S1, S2 normal, no murmur, rub or gallop  Abdomen:  Soft, non-tender, bowel sounds active all four quadrants,  no masses, no organomegaly  Extremities: Extremities normal, atraumatic, no cyanosis or edema  Pulses: 2+ and symmetric  Skin: Skin color, texture, turgor normal, no rashes or lesions  Neurologic: Moves all extremities spontaneously        Pertinent  and/or Most Recent Results     LAB RESULTS:      Lab 06/16/24  2255 06/16/24  0744 06/16/24  0733 06/15/24  1103 06/15/24  0834 06/15/24  0830   WBC 11.64* 13.70*  --   --   --  11.99*   HEMOGLOBIN 13.1 13.8  --   --   --  13.8   HEMATOCRIT 40.5 42.0  --   --   --  44.3   PLATELETS 223 256  --   --   --  280   NEUTROS ABS 8.73* 11.15*  --   --   --  8.64*   IMMATURE GRANS (ABS) 0.05 0.06*  --   --   --  0.28*   LYMPHS ABS 1.64 1.43  --   --   --  2.32   MONOS ABS 1.16* 1.02*  --   --   --  0.58   EOS ABS 0.02 0.00  --   --   --  0.12   MCV 96.2 94.4  --   --   --  98.7*   SED RATE  --   --   --   --   --  15   CRP  --   --   --  <0.30  --   --    LACTATE  --   --  1.9 3.0* 4.8*  --           Lab 06/17/24  0940 06/16/24  2255 06/16/24  0744 06/15/24  1103   SODIUM  --  137 137 140   POTASSIUM 4.3 3.3* 3.6 3.8   CHLORIDE  --  104 103 105   CO2  --  21.7* 20.9* 21.8*   ANION GAP  --  11.3 13.1 13.2   BUN  --  16 14 19   CREATININE  --  0.91 0.71 0.84   EGFR  --  62.7 84.5 69.0   GLUCOSE  --  121* 119* 115*   CALCIUM  --  8.6 8.7 9.0         Lab 06/16/24  2255 06/16/24  0744 06/15/24  1103   TOTAL PROTEIN 6.6 7.1 7.2   ALBUMIN 3.7 3.9 3.9   GLOBULIN 2.9 3.2 3.3   ALT (SGPT) 18 20 22   AST (SGOT) 35* 38* 27   BILIRUBIN 0.6 0.7 0.3   ALK PHOS 93 89 84         Lab 06/15/24  1319 06/15/24  1103   HSTROP T 263* 199*                 Brief Urine Lab Results  (Last result in the past 365 days)        Color   Clarity   Blood   Leuk Est   Nitrite   Protein   CREAT   Urine HCG        06/15/24 0911 Yellow   Clear   Trace   Trace   Positive   Negative                 Microbiology Results (last 10 days)       Procedure Component Value - Date/Time    Blood Culture - Blood, Arm, Right [[id number]]  (Normal) Collected: 06/15/24 1006    Lab Status: Preliminary result Specimen: Blood from Arm, Right Updated: 06/17/24 1015     Blood Culture No growth at 2 days    Narrative:      Less than seven (7) mL's of blood was collected.  Insufficient quantity may yield false negative results.    Urine Culture - Urine, Straight Cath [[id number]]  (Abnormal)  (Susceptibility) Collected: 06/15/24 0911    Lab Status: Final result Specimen: Urine from Straight Cath Updated: 06/17/24 1026     Urine Culture >100,000 CFU/mL Escherichia coli    Narrative:      Colonization of the urinary tract without infection is common. Treatment is discouraged unless the patient is symptomatic, pregnant, or undergoing an invasive urologic procedure.    Susceptibility        Escherichia coli      ARDEN      Amoxicillin + Clavulanate Intermediate      Ampicillin Resistant      Ampicillin + Sulbactam Resistant      Cefazolin Susceptible      Cefepime Susceptible       Ceftazidime Susceptible      Ceftriaxone Susceptible      Gentamicin Susceptible      Levofloxacin Resistant      Nitrofurantoin Susceptible      Piperacillin + Tazobactam Resistant      Trimethoprim + Sulfamethoxazole Susceptible                           Blood Culture - Blood, Arm, Left [[id number]]  (Normal) Collected: 06/15/24 0830    Lab Status: Preliminary result Specimen: Blood from Arm, Left Updated: 06/17/24 0845     Blood Culture No growth at 2 days    Narrative:      Less than seven (7) mL's of blood was collected.  Insufficient quantity may yield false negative results.            CT Head Without Contrast    Result Date: 6/15/2024  Impression: Impression: 1.Advanced senescent changes of the brain without mass lesion or acute process identified on CT imaging. Electronically Signed: Mike Leon MD  6/15/2024 8:55 AM EDT  Workstation ID: [id number]    XR Chest 1 View    Result Date: 6/15/2024  Impression: Impression: 1. No gross infiltrate or edema 2. Hiatal hernia Electronically Signed: Timoteo Han  6/15/2024 8:28 AM EDT  Workstation ID: [id number]                 Labs Pending at Discharge:  Pending Labs       Order Current Status    Blood Culture - Blood, Arm, Left Preliminary result    Blood Culture - Blood, Arm, Right Preliminary result            Procedures Performed           Consults:   Consults       Date and Time Order Name Status Description    6/15/2024 11:46 AM Hospitalist (on-call MD unless specified)                Discharge Details        Discharge Medications        New Medications        Instructions Start Date   cefdinir 300 MG capsule  Commonly known as: OMNICEF   300 mg, Oral, 2 Times Daily             Continue These Medications        Instructions Start Date   acetaminophen 325 MG tablet  Commonly known as: TYLENOL   650 mg, Oral, 4 Times Daily      amLODIPine 5 MG tablet  Commonly known as: NORVASC   5 mg, Oral, Daily      Biotin 5 MG capsule   5 mg, Oral, Daily      melatonin 3 MG  tablet   3 mg, Oral, Nightly      naloxone 4 MG/0.1ML nasal spray  Commonly known as: NARCAN   1 spray, Nasal, 5 Times Daily PRN, Administer one dose of naloxone every 2-3 minutes if there is no reaction or improvement (that is, the person is still unconscious to voice or touch, or not breathing normally)       nystatin 048875 UNIT/GM cream  Commonly known as: MYCOSTATIN   1 Application, Topical, 2 Times Daily, Clean with soap and water under right breast, pat dry and apply cream BID until healed       ondansetron ODT 4 MG disintegrating tablet  Commonly known as: ZOFRAN-ODT   4 mg, Translingual, Every 8 Hours PRN      polyethylene glycol 17 g packet  Commonly known as: MIRALAX   17 g, Oral, Daily PRN      potassium chloride 20 MEQ CR tablet  Commonly known as: KLOR-CON M20   20 mEq, Oral, Every Night at Bedtime      sertraline 50 MG tablet  Commonly known as: ZOLOFT   50 mg, Oral, Every Morning      traMADol 50 MG tablet  Commonly known as: ULTRAM   50 mg, Oral, 3 times daily      vitamin B-12 1000 MCG tablet  Commonly known as: CYANOCOBALAMIN   1,000 mcg, Oral, Daily               No Known Allergies      Discharge Disposition:     Long Term Care (DC - External)    Diet:  Hospital:  Diet Order   Procedures    Diet: Regular/House; Fluid Consistency: Thin (IDDSI 0)         Discharge Activity:         CODE STATUS:  Code Status and Medical Interventions:   Ordered at: 06/15/24 1300     Level Of Support Discussed With:    Next of Kin (If No Surrogate)     Code Status (Patient has no pulse and is not breathing):    No CPR (Do Not Attempt to Resuscitate)     Medical Interventions (Patient has pulse or is breathing):    Comfort Measures     Comments:    Per post form- with patient         No future appointments.    Additional Instructions for the Follow-ups that You Need to Schedule       Discharge Follow-up with PCP   As directed       Currently Documented PCP:    Can Vieira MD    PCP Phone Number:    [phone]      Follow Up Details: Follow-up with your PCP in 1 week.                Time spent on Discharge including face to face service:  >30 minutes    Signature: Electronically signed by Jay Johnson MD, 06/17/24, 12:57 EDT.  [facility] Hospitalist Team

## 2024-06-17 NOTE — DISCHARGE PLACEMENT REQUEST
"Trung Barnes (83 y.o. Female)       Date of Birth   1940    Social Security Number       Address   [address]  [place] [address]    Home Phone   [phone]    MRN   [id number]       Lutheran   Religious    Marital Status                               Admission Date   6/15/24    Admission Type   Emergency    Admitting Provider   Pepe Ambrocio MD    Attending Provider   Jay Johnson MD    Department, Room/Bed   [facility], 2108/1       Discharge Date       Discharge Disposition       Discharge Destination                                 Attending Provider: Jay Johnson MD    Allergies: No Known Allergies    Isolation: None   Infection: None   Code Status: No CPR    Ht: 160 cm (63\")   Wt: 55.1 kg (121 lb 7.6 oz)    Admission Cmt: None   Principal Problem: UTI (urinary tract infection) [N39.0]                   Active Insurance as of 6/15/2024       Primary Coverage       Payor Plan Insurance Group Employer/Plan Group    [facility] MEDICARE REPLACEMENT [facility] MED ADV SNP PPO [id number]       Payor Plan Address Payor Plan Phone Number Payor Plan Fax Number Effective Dates    [address] [address]   2/1/2024 - None Entered    [place] [id number]         Subscriber Name Subscriber Birth Date Member ID       TRUNG BARNES 1940 [id number]               Secondary Coverage       Payor Plan Insurance Group Employer/Plan Group    [place]        Payor Plan Address Payor Plan Phone Number Payor Plan Fax Number Effective Dates    [address]   6/15/2024 - None Entered    [place] [address]         Subscriber Name Subscriber Birth Date Member ID       TRUNG BARNES R 1940 [id number]                     Emergency Contacts        (Rel.) Home Phone Work Phone Mobile Phone    SKYLER FREIRE (Relative) -- -- [phone]    VickiFarzaneh (Sister) [phone] -- --               "   History & Physical        Miomaryenoch XiomyPHIL Brooks at 06/15/24 1234       Attestation signed by Pepe Ambrocio MD at 06/15/24 1530    I have reviewed this documentation and agree.                      [facility] Medicine Services  History & Physical    Patient Name: Amparo Cohen  : 1940  MRN: [id number]  Primary Care Physician:  Can Vieira MD  Date of admission: 6/15/2024  Date and Time of Service: 6/15/2024 at 12:43 PM EDT      Subjective      Chief Complaint: Dementia, UTI    History of Present Illness: Amparo Cohen is a 83 y.o. female with a H of dementia, Alzheimer's, who presented to [facility] on 6/15/2024 with altered mental status and seizure-like activity.  Patient is a resident of [facility].  EMS was called today for patient to have seizure-like activity which lasted approximately 30 minutes, patient desatted during this time and was requiring oxygen upon EMS arrival.  Patient is a very poor historian, unable to answer questions but can respond to her name being called.  No family is at bedside at this time.    In the emergency department CT head negative.  EKG NSR, chest x-ray negative.  Initial lactate 4.5, 3 on repeat.  Patient was given 1 L of IV fluids, and 1 g of Rocephin due to urine analysis which was remarkable for: Trace blood, positive nitrite, trace leukocytes, 6-10 whites, 4+ bacteria.      Review of Systems   Unable to perform ROS: Dementia       Personal History     History reviewed. No pertinent past medical history.    History reviewed. No pertinent surgical history.    Family History: family history is not on file. Otherwise pertinent FHx was reviewed and not pertinent to current issue.    Social History:      Home Medications:  Prior to Admission Medications       None              Allergies:  No Known Allergies    Objective      Vitals:   Temp:  [97.7 °F (36.5 °C)] 97.7 °F (36.5 °C)  Heart Rate:  [71-80] 71  Resp:  [16-19] 16  BP:  (101-130)/(62-75) 101/71  Body mass index is 21.26 kg/m².  Physical Exam  PHYSICAL EXAM  Constitutional:  Well developed, well nourished, no acute distress, non-toxic appearance   Eyes:  PERRL, conjunctiva normal, EOMI   HENT:  Atraumatic, external ears normal, nose normal, oropharynx moist, no pharyngeal exudates. Neck-normal range of motion, no tenderness, supple, trachea midline  Respiratory:  ctab, non-labored respirations without accessory muscle use  Cardiovascular:  Normal rate, normal rhythm, no murmurs, no gallops, no rubs   GI:  Soft, nondistended, normal bowel sounds, nontender, no organomegaly, no mass, no rebound, no guarding   :  No costovertebral angle tenderness   Musculoskeletal:  No edema, no tenderness, no deformities  Integument:  Well hydrated, no rash   Lymphatic:  No lymphadenopathy noted   Neurologic: Demented, normal motor function, normal sensory function, no focal deficits noted   Psychiatric: Babbling speech  Diagnostic Data:  Lab Results (last 24 hours)       Procedure Component Value Units Date/Time    C-reactive Protein [[id number]]  (Normal) Collected: 06/15/24 1103    Specimen: Blood from Arm, Left Updated: 06/15/24 1156     C-Reactive Protein <0.30 mg/dL     STAT Lactic Acid, Reflex [[id number]]  (Abnormal) Collected: 06/15/24 1103    Specimen: Blood Updated: 06/15/24 1137     Lactate 3.0 mmol/L     High Sensitivity Troponin T [[id number]]  (Abnormal) Collected: 06/15/24 1103    Specimen: Blood from Arm, Left Updated: 06/15/24 1136     HS Troponin T 199 ng/L     Narrative:      High Sensitive Troponin T Reference Range:  <14.0 ng/L- Negative Female for AMI  <22.0 ng/L- Negative Male for AMI  >=14 - Abnormal Female indicating possible myocardial injury.  >=22 - Abnormal Male indicating possible myocardial injury.   Clinicians would have to utilize clinical acumen, EKG, Troponin, and serial changes to determine if it is an Acute Myocardial Infarction or myocardial injury due to an  underlying chronic condition.         Comprehensive Metabolic Panel [[id number]]  (Abnormal) Collected: 06/15/24 1103    Specimen: Blood from Arm, Left Updated: 06/15/24 1135     Glucose 115 mg/dL      BUN 19 mg/dL      Creatinine 0.84 mg/dL      Sodium 140 mmol/L      Potassium 3.8 mmol/L      Chloride 105 mmol/L      CO2 21.8 mmol/L      Calcium 9.0 mg/dL      Total Protein 7.2 g/dL      Albumin 3.9 g/dL      ALT (SGPT) 22 U/L      AST (SGOT) 27 U/L      Alkaline Phosphatase 84 U/L      Total Bilirubin 0.3 mg/dL      Globulin 3.3 gm/dL      A/G Ratio 1.2 g/dL      BUN/Creatinine Ratio 22.6     Anion Gap 13.2 mmol/L      eGFR 69.0 mL/min/1.73     Narrative:      GFR Normal >60  Chronic Kidney Disease <60  Kidney Failure <15    The GFR formula is only valid for adults with stable renal function between ages 18 and 70.    Blood Culture - Blood, Arm, Right [[id number]] Collected: 06/15/24 1006    Specimen: Blood from Arm, Right Updated: 06/15/24 1012    Urinalysis With Culture If Indicated - Straight Cath [[id number]]  (Abnormal) Collected: 06/15/24 0911    Specimen: Urine from Straight Cath Updated: 06/15/24 0938     Color, UA Yellow     Appearance, UA Clear     pH, UA <=5.0     Specific Gravity, UA 1.019     Glucose, UA Negative     Ketones, UA Negative     Bilirubin, UA Negative     Blood, UA Trace     Protein, UA Negative     Leuk Esterase, UA Trace     Nitrite, UA Positive     Urobilinogen, UA 0.2 E.U./dL    Narrative:      In absence of clinical symptoms, the presence of pyuria, bacteria, and/or nitrites on the urinalysis result does not correlate with infection.    Urinalysis, Microscopic Only - Straight Cath [[id number]]  (Abnormal) Collected: 06/15/24 0911    Specimen: Urine from Straight Cath Updated: 06/15/24 0938     RBC, UA 0-2 /HPF      WBC, UA 6-10 /HPF      Bacteria, UA 4+ /HPF      Squamous Epithelial Cells, UA 3-6 /HPF      Hyaline Casts, UA 3-6 /LPF      Methodology Automated Microscopy    Urine Culture  - Urine, Straight Cath [[id number]] Collected: 06/15/24 0911    Specimen: Urine from Straight Cath Updated: 06/15/24 0938    Sedimentation Rate [[id number]]  (Normal) Collected: 06/15/24 0830    Specimen: Blood from Arm, Left Updated: 06/15/24 0841     Sed Rate 15 mm/hr     CBC & Differential [[id number]]  (Abnormal) Collected: 06/15/24 0830    Specimen: Blood from Arm, Left Updated: 06/15/24 0837    Narrative:      The following orders were created for panel order CBC & Differential.  Procedure                               Abnormality         Status                     ---------                               -----------         ------                     CBC Auto Differential[[id number]]        Abnormal            Final result                 Please view results for these tests on the individual orders.    CBC Auto Differential [[id number]]  (Abnormal) Collected: 06/15/24 0830    Specimen: Blood from Arm, Left Updated: 06/15/24 0837     WBC 11.99 10*3/mm3      RBC 4.49 10*6/mm3      Hemoglobin 13.8 g/dL      Hematocrit 44.3 %      MCV 98.7 fL      MCH 30.7 pg      MCHC 31.2 g/dL      RDW 12.8 %      RDW-SD 46.9 fl      MPV 10.0 fL      Platelets 280 10*3/mm3      Neutrophil % 72.2 %      Lymphocyte % 19.3 %      Monocyte % 4.8 %      Eosinophil % 1.0 %      Basophil % 0.4 %      Immature Grans % 2.3 %      Neutrophils, Absolute 8.64 10*3/mm3      Lymphocytes, Absolute 2.32 10*3/mm3      Monocytes, Absolute 0.58 10*3/mm3      Eosinophils, Absolute 0.12 10*3/mm3      Basophils, Absolute 0.05 10*3/mm3      Immature Grans, Absolute 0.28 10*3/mm3      nRBC 0.0 /100 WBC     POC Lactate [[id number]]  (Abnormal) Collected: 06/15/24 0834    Specimen: Blood Updated: 06/15/24 0836     Lactate 4.8 mmol/L      Comment: Serial Number: [id number]:  [id number]       Blood Culture - Blood, Arm, Left [[id number]] Collected: 06/15/24 0830    Specimen: Blood from Arm, Left Updated: 06/15/24 0833             Imaging Results (Last 24  Hours)       Procedure Component Value Units Date/Time    CT Head Without Contrast [[id number]] Collected: 06/15/24 0854     Updated: 06/15/24 0857    Narrative:      CT HEAD WO CONTRAST    Date of Exam: 6/15/2024 8:43 AM EDT    Indication: new onset of seizures.    Comparison: None available.    Technique: Axial CT images were obtained of the head without contrast administration.  Coronal reconstructions were performed.  Automated exposure control and iterative reconstruction methods were used.      Findings:  There is motion artifact which limits image quality.    There is no evidence of acute territorial infarction.    There is no acute intracranial hemorrhage. There are no extra-axial collections.    Ventricles and CSF spaces are symmetrically prominent. No mass effect nor hydrocephalus.    There is moderate to advanced white matter hypoattenuation most frequently seen in the setting of microvascular ischemic disease. No underlying mass is identified on unenhanced CT imaging.     Paranasal sinuses and mastoid air cells are adequately aerated.     Osseous structures and orbits appear intact.      Impression:      Impression:  1.Advanced senescent changes of the brain without mass lesion or acute process identified on CT imaging.      Electronically Signed: Mike Leon MD    6/15/2024 8:55 AM EDT    Workstation ID: [id number]    XR Chest 1 View [[id number]] Collected: 06/15/24 0826     Updated: 06/15/24 0830    Narrative:      XR CHEST 1 VW    Date of Exam: 6/15/2024 8:25 AM EDT    Indication: altered mental    Comparison: None available.    Findings:  Heart size borderline. No gross infiltrate or edema. Atelectasis in the right lung base. Moderate hiatal hernia apparently containing a loop of intestine      Impression:      Impression:    1. No gross infiltrate or edema  2. Hiatal hernia      Electronically Signed: Timoteo Han    6/15/2024 8:28 AM EDT    Workstation ID: [id number]              Assessment & Plan         This is a 83 y.o. female with:    Active and Resolved Problems  Active Hospital Problems    Diagnosis  POA    **UTI (urinary tract infection) [N39.0]  Yes      Resolved Hospital Problems   No resolved problems to display.       UTI  Dehydration  - MIVF  - Continue Rocephin every 24 hours  - Urine cultures pending    Dementia  - Continue Zoloft, tramadol, melatonin    HTN  -Continue amlodipine    VTE Prophylaxis:  Pharmacologic VTE prophylaxis orders are present.        The patient desires to be as follows:    CODE STATUS:           Yasmine Blum, who can be contacted at [phone], is the designated person to make medical decisions on the patient's behalf if She is incapable of doing so. This was clarified with patient and/or next of kin on 6/15/2024 during the course of this H&P.    Admission Status:  I believe this patient meets inpatient status.    Expected Length of Stay: 2 nights    PDMP and Medication Dispenses via Sidebar reviewed and consistent with patient reported medications.    I discussed the patient's findings and my recommendations with patient.      Signature:     This document has been electronically signed by PHIL Daniel on Violeta 15, 2024 12:40 EDT   [facility]    Electronically signed by Pepe Ambrocio MD at 06/15/24 6175

## 2024-06-17 NOTE — CASE MANAGEMENT/SOCIAL WORK
"Physicians Statement of Medical Necessity for  Ambulance Transportation    GENERAL INFORMATION     Name: Amparo Cohen  YOB: 1940  Medicare #: [id number]   Transport Date: 6/17/24 (Valid for round trips this date, or for scheduled repetitive trips for 60 days from the date signed below.)  Origin: [facility]  Destination: [facility]  Is the Patient's stay covered under Medicare Part A (PPS/DRG?)Yes  Closest appropriate facility? Yes  If this a hosp-hosp transfer? No  Is this a hospice patient? No    MEDICAL NECESSITY QUESTIONAIRE    Ambulance Transportation is medically necessary only if other means of transportation are contraindicated or would be potentially harmful to the patient.  To meet this requirement, the patient must be either \"bed confined\" or suffer from a condition such that transport by means other than an ambulance is contraindicated by the patient's condition.  The following questions must be answered by the healthcare professional signing below for this form to be valid:     1) Describe the MEDICAL CONDITION (physical and/or mental) of this patient AT THE TIME OF AMBULANCE TRANSPORT that requires the patient to be transported in an ambulance, and why transport by other means is contraindicated by the patient's condition: Dementia, Alzheimer's, confused.  History reviewed. No pertinent past medical history.   History reviewed. No pertinent surgical history.   2) Is this patient \"bed confined\" as defined below?Yes   To be \"bed confined\" the patient must satisfy all three of the following criteria:  (1) unable to get up from bed without assistance; AND (2) unable to ambulate;  AND (3) unable to sit in a chair or wheelchair.  3) Can this patient safely be transported by car or wheelchair van (I.e., may safely sit during transport, without an attendant or monitoring?)No   4. In addition to completing questions 1-3 above, please check any of the following conditions that " apply*:          *Note: supporting documentation for any boxes checked must be maintained in the patient's medical records Patient is confused and Unable to tolerate seated position for time needed to transport      SIGNATURE OF PHYSICIAN OR OTHER AUTHORIZED HEALTHCARE PROFESSIONAL    I certify that the above information is true and correct based on my evaluation of this patient, and represent that the patient requires transport by ambulance and that other forms of transport are contraindicated.  I understand that this information will be used by the Centers for Medicare and Medicaid Services (CMS) to support the determiniation of medical necessity for ambulance services, and I represent that I have personal knowledge of the patient's condition at the time of transport.    SAMMIE Waldron RN   If this box is checked, I also certify that the patient is physically or mentally incapable of signing the ambulance service's claim form and that the institution with which I am affiliated has furnished care, services or assistance to the patient.  My signature below is made on behalf of the patient pursuant to 42 .36(b)(4). In accordance with 42 .37, the specific reason(s) that the patient is physically or mentally incapable of signing the claim for is as follows: Dr. Samuel    Signature of Physician or Healthcare Professional  Date/Time:   6/17/24     (For Scheduled repetitive transport, this form is not valid for transports performed more than 60 days after this date).                                                                                                                                            --------------------------------------------------------------------------------------------  Printed Name and Credentials of Physician or Authorized Healthcare Professional     *Form must be signed by patient's attending physician for scheduled, repetitive transports,.  For non-repetitive ambulance transports, if  unable to obtain the signature of the attending physician, any of the following may sign (please select below):     Physician  Clinical Nurse Specialist  Registered Nurse     Physician Assistant  Discharge Planner  Licensed Practical Nurse     Nurse Practitioner   X

## 2024-06-17 NOTE — PLAN OF CARE
Goal Outcome Evaluation:        Problem: Adult Inpatient Plan of Care  Goal: Plan of Care Review  Outcome: Ongoing, Progressing  Goal: Patient-Specific Goal (Individualized)  Outcome: Ongoing, Progressing  Goal: Absence of Hospital-Acquired Illness or Injury  Outcome: Ongoing, Progressing  Intervention: Identify and Manage Fall Risk  Recent Flowsheet Documentation  Taken 6/17/2024 1631 by Kaley East RN  Safety Promotion/Fall Prevention:   activity supervised   assistive device/personal items within reach   clutter free environment maintained   fall prevention program maintained   nonskid shoes/slippers when out of bed   room organization consistent   safety round/check completed  Taken 6/17/2024 1415 by Kaley East RN  Safety Promotion/Fall Prevention:   activity supervised   assistive device/personal items within reach   clutter free environment maintained   fall prevention program maintained   nonskid shoes/slippers when out of bed   room organization consistent   safety round/check completed  Taken 6/17/2024 1250 by Kaley East RN  Safety Promotion/Fall Prevention:   activity supervised   assistive device/personal items within reach   clutter free environment maintained   fall prevention program maintained   nonskid shoes/slippers when out of bed   safety round/check completed   room organization consistent  Taken 6/17/2024 1006 by Kaley East RN  Safety Promotion/Fall Prevention:   activity supervised   assistive device/personal items within reach   clutter free environment maintained   fall prevention program maintained   nonskid shoes/slippers when out of bed   room organization consistent   safety round/check completed  Taken 6/17/2024 0800 by Kaley East RN  Safety Promotion/Fall Prevention:   activity supervised   assistive device/personal items within reach   clutter free environment maintained   fall prevention program maintained   nonskid shoes/slippers when out of bed   room  organization consistent   safety round/check completed  Intervention: Prevent Skin Injury  Recent Flowsheet Documentation  Taken 6/17/2024 1631 by Kaley East RN  Body Position:   turned   left  Skin Protection: adhesive use limited  Taken 6/17/2024 1250 by Kaley East RN  Body Position: right  Skin Protection: adhesive use limited  Taken 6/17/2024 0800 by Kaley East RN  Body Position: supine  Intervention: Prevent and Manage VTE (Venous Thromboembolism) Risk  Recent Flowsheet Documentation  Taken 6/17/2024 1631 by Kaley East RN  Activity Management:   activity encouraged   bedrest  VTE Prevention/Management: patient refused intervention  Range of Motion: active ROM (range of motion) encouraged  Taken 6/17/2024 1250 by Kaley East RN  Activity Management:   activity encouraged   bedrest  Range of Motion: active ROM (range of motion) encouraged  Taken 6/17/2024 0800 by Kaley East RN  Activity Management: activity encouraged  VTE Prevention/Management: patient refused intervention  Range of Motion: ROM (range of motion) performed  Intervention: Prevent Infection  Recent Flowsheet Documentation  Taken 6/17/2024 1631 by Kaley East RN  Infection Prevention:   hand hygiene promoted   personal protective equipment utilized   rest/sleep promoted   single patient room provided   environmental surveillance performed  Taken 6/17/2024 1415 by Kaley East RN  Infection Prevention:   hand hygiene promoted   personal protective equipment utilized   rest/sleep promoted   single patient room provided   environmental surveillance performed  Taken 6/17/2024 1250 by Kaley East RN  Infection Prevention:   hand hygiene promoted   personal protective equipment utilized   rest/sleep promoted   single patient room provided   environmental surveillance performed  Taken 6/17/2024 1006 by Kaley East RN  Infection Prevention:   personal protective equipment utilized   hand hygiene  promoted   rest/sleep promoted   single patient room provided   environmental surveillance performed  Taken 6/17/2024 0800 by Kaley East, RN  Infection Prevention:   personal protective equipment utilized   hand hygiene promoted   single patient room provided   rest/sleep promoted   environmental surveillance performed  Goal: Optimal Comfort and Wellbeing  Outcome: Ongoing, Progressing  Intervention: Provide Person-Centered Care  Recent Flowsheet Documentation  Taken 6/17/2024 1631 by Kaley East RN  Trust Relationship/Rapport: care explained  Goal: Readiness for Transition of Care  Outcome: Ongoing, Progressing

## 2024-06-20 LAB
BACTERIA SPEC AEROBE CULT: NORMAL
BACTERIA SPEC AEROBE CULT: NORMAL